# Patient Record
(demographics unavailable — no encounter records)

---

## 2024-11-18 NOTE — PHYSICAL EXAM
[[id number]] : A chaperone was present during the pelvic exam. [Appropriately responsive] : appropriately responsive [Alert] : alert [No Acute Distress] : no acute distress [No Lymphadenopathy] : no lymphadenopathy [Regular Rate Rhythm] : regular rate rhythm [No Murmurs] : no murmurs [Clear to Auscultation B/L] : clear to auscultation bilaterally [Soft] : soft [Non-tender] : non-tender [Non-distended] : non-distended [No HSM] : No HSM [No Lesions] : no lesions [No Mass] : no mass [Oriented x3] : oriented x3 [Examination Of The Breasts] : a normal appearance [No Masses] : no breast masses were palpable [Vulvar Atrophy] : vulvar atrophy [Labia Majora] : normal [Labia Minora] : normal [Atrophy] : atrophy [Normal] : normal [Uterine Adnexae] : normal [Declined] : Patient declined rectal exam [FreeTextEntry2] :  Elva tapia)

## 2024-11-18 NOTE — PLAN
[FreeTextEntry1] :  #HCM  -Breast self exam reviewed and taught  -STI Screening declined  -Pap/HPV today  -Rx for mammogram and breast sonogram given  -DXA to be Rxed  -Advised pt to schedule colonoscopy  -Immunizations: UTD    RTO in 1 year for annual or PRN for any GYN complaints   LORY Dumas MD

## 2024-11-18 NOTE — HISTORY OF PRESENT ILLNESS
[Patient reported colonoscopy was normal] : Patient reported colonoscopy was normal [FreeTextEntry1] :  2024. MOISES BAUTISTA 60 year old female  LMP PM  presents for an annual gyn exam and to establish care. PMH of HLD.   No vaginal discharge or vaginitis symptoms. She denies abdominal or pelvic pain. No urinary complaints. BM is normal per patient. Currently sexually active, uses condoms.   Denies changes in medical status, medications, serious illness, hospitalizations, and surgeries.   OBHx: ; b/l oophorectomy 2/2 ovarian cysts-benign G1-   6lb 2oz G2   6lb 13oz  GynHx: denies  PMH: HLD  SHx: ex-lap bilateral oophorectomy ()  Meds: rosuvastatin, methocarbamol   All: NKDA; IV contrast  Soc: No alcohol, drug, or tobacco use, non-smoker.  Psych: negative  FHx: Denies FHx of breast, ovarian, uterine or colon cancer. [Mammogramdate] : 04/2024 [TextBox_19] : BIRADS 1 [BreastSonogramDate] : 04/2024 [TextBox_25] : BIRADS 1 [TextBox_31] : unknown [BoneDensityDate] : 06/2022 [TextBox_37] : normal [ColonoscopyDate] : 2018 [TextBox_43] : due in 5 years

## 2024-12-10 NOTE — PHYSICAL EXAM
[No Acute Distress] : no acute distress [Well Nourished] : well nourished [Well Developed] : well developed [Supple] : supple [No Respiratory Distress] : no respiratory distress  [Clear to Auscultation] : lungs were clear to auscultation bilaterally [Normal Rate] : normal rate  [Regular Rhythm] : with a regular rhythm [Normal S1, S2] : normal S1 and S2 [No Edema] : there was no peripheral edema [Soft] : abdomen soft [Non Tender] : non-tender [Normal Bowel Sounds] : normal bowel sounds [No CVA Tenderness] : no CVA  tenderness [No Spinal Tenderness] : no spinal tenderness [No Joint Swelling] : no joint swelling [Grossly Normal Strength/Tone] : grossly normal strength/tone [Normal Gait] : normal gait [Speech Grossly Normal] : speech grossly normal [Normal Affect] : the affect was normal [Alert and Oriented x3] : oriented to person, place, and time [de-identified] : Overweight female in stated age,

## 2024-12-10 NOTE — PHYSICAL EXAM
[No Acute Distress] : no acute distress [Well Nourished] : well nourished [Well Developed] : well developed [Supple] : supple [No Respiratory Distress] : no respiratory distress  [Clear to Auscultation] : lungs were clear to auscultation bilaterally [Normal Rate] : normal rate  [Regular Rhythm] : with a regular rhythm [Normal S1, S2] : normal S1 and S2 [No Edema] : there was no peripheral edema [Soft] : abdomen soft [Non Tender] : non-tender [Normal Bowel Sounds] : normal bowel sounds [No CVA Tenderness] : no CVA  tenderness [No Spinal Tenderness] : no spinal tenderness [No Joint Swelling] : no joint swelling [Grossly Normal Strength/Tone] : grossly normal strength/tone [Normal Gait] : normal gait [Speech Grossly Normal] : speech grossly normal [Normal Affect] : the affect was normal [Alert and Oriented x3] : oriented to person, place, and time [de-identified] : Overweight female in stated age,

## 2024-12-10 NOTE — HISTORY OF PRESENT ILLNESS
[FreeTextEntry1] : Pt presented follow up. [de-identified] : Pt continues to have palpitation, she has it daily, it can be 1-2 x per day, it lasted a few minutes.  She feels that it can occur randomly.  She denied associated CP, SOB or dizziness.  She thinks she may have some chest discomfort in the chest.  She feels that symptoms resolve completely.  She feels that her stress level did not change.  She does a lot of walking at lunch time.  She saw GYn and

## 2024-12-10 NOTE — HISTORY OF PRESENT ILLNESS
[FreeTextEntry1] : Pt presented follow up. [de-identified] : Pt continues to have palpitation, she has it daily, it can be 1-2 x per day, it lasted a few minutes.  She feels that it can occur randomly.  She denied associated CP, SOB or dizziness.  She thinks she may have some chest discomfort in the chest.  She feels that symptoms resolve completely.  She feels that her stress level did not change.  She does a lot of walking at lunch time.  She saw GYn and

## 2025-01-23 NOTE — PLAN
[TextEntry] : - Symptoms and signs consistent with LPR. Discussed diet and lifestyle changes. Written instructions given to the patient today. - Thyroid US showing sub centimeter cysts. No need to follow up for that. - Feeling some pressure in the mid neck bilaterally. No masses on palpation or US in the  office today. - Discussed findings with the patient and her  today. - Return as needed.

## 2025-01-23 NOTE — HISTORY OF PRESENT ILLNESS
[de-identified] : 60 yro pt here for eval of thyroid.  Patient reports that she feels like her neck feels "off", feels like there is something there, but does not feel any lumps. Able to move neck around. No pain, but feels some discomfort. Reports that her PCP said everything looked normal, but patient would like to make sure nothing else is there.  Today presents for follow up.  Reports she feels something more on the sides of her throat now.  No dysphagia, dysphonia or dyspnea Patient denies h/o radiation and has no family h/o thyroid cancer. No biopsies US thyroid 9/7/2024: IMPRESSION: Scattered subcentimeter benign-appearing colloid cysts. There are no suspicious lesions TI-RAD 1: Benign (No FNA)  General

## 2025-01-23 NOTE — HISTORY OF PRESENT ILLNESS
[de-identified] : 60 yro pt here for eval of thyroid.  Patient reports that she feels like her neck feels "off", feels like there is something there, but does not feel any lumps. Able to move neck around. No pain, but feels some discomfort. Reports that her PCP said everything looked normal, but patient would like to make sure nothing else is there.  Today presents for follow up.  Reports she feels something more on the sides of her throat now.  No dysphagia, dysphonia or dyspnea Patient denies h/o radiation and has no family h/o thyroid cancer. No biopsies US thyroid 9/7/2024: IMPRESSION: Scattered subcentimeter benign-appearing colloid cysts. There are no suspicious lesions TI-RAD 1: Benign (No FNA)

## 2025-07-01 NOTE — PHYSICAL EXAM
[No Acute Distress] : no acute distress [Well Nourished] : well nourished [Well Developed] : well developed [Normal Sclera/Conjunctiva] : normal sclera/conjunctiva [PERRL] : pupils equal round and reactive to light [EOMI] : extraocular movements intact [Normal Outer Ear/Nose] : the outer ears and nose were normal in appearance [Normal Oropharynx] : the oropharynx was normal [Normal TMs] : both tympanic membranes were normal [No JVD] : no jugular venous distention [No Lymphadenopathy] : no lymphadenopathy [Supple] : supple [No Respiratory Distress] : no respiratory distress  [Clear to Auscultation] : lungs were clear to auscultation bilaterally [Normal Rate] : normal rate  [Regular Rhythm] : with a regular rhythm [Normal S1, S2] : normal S1 and S2 [No Carotid Bruits] : no carotid bruits [Pedal Pulses Present] : the pedal pulses are present [No Edema] : there was no peripheral edema [No Extremity Clubbing/Cyanosis] : no extremity clubbing/cyanosis [Normal Appearance] : normal in appearance [No Masses] : no palpable masses [No Nipple Discharge] : no nipple discharge [No Axillary Lymphadenopathy] : no axillary lymphadenopathy [Soft] : abdomen soft [Non Tender] : non-tender [Non-distended] : non-distended [Normal Bowel Sounds] : normal bowel sounds [Normal Sphincter Tone] : normal sphincter tone [No Mass] : no mass [Normal Supraclavicular Nodes] : no supraclavicular lymphadenopathy [Normal Axillary Nodes] : no axillary lymphadenopathy [Normal Posterior Cervical Nodes] : no posterior cervical lymphadenopathy [Normal Anterior Cervical Nodes] : no anterior cervical lymphadenopathy [No CVA Tenderness] : no CVA  tenderness [No Spinal Tenderness] : no spinal tenderness [No Joint Swelling] : no joint swelling [Grossly Normal Strength/Tone] : grossly normal strength/tone [No Rash] : no rash [Coordination Grossly Intact] : coordination grossly intact [No Focal Deficits] : no focal deficits [Normal Gait] : normal gait [Speech Grossly Normal] : speech grossly normal [Normal Affect] : the affect was normal [Alert and Oriented x3] : oriented to person, place, and time [Normal Mood] : the mood was normal [Stool Occult Blood] : stool negative for occult blood [de-identified] : Overweight female in stated age,  [de-identified] : S

## 2025-07-01 NOTE — HISTORY OF PRESENT ILLNESS
[Spouse] : spouse [FreeTextEntry1] : 61 y/o woman presented for PE.  Last PE was 1 year ago. [de-identified] : Her health was uneventful since last visit, she has no new complaint.  Pt saw cardiologist in 12/2025 for palpitations.  Echo and Holter were unremarkable. She still has occ fast heart beat.  Pt had COVID infection in 9/2022 and 8/2023, they were mild.  She had 4 doses of COVID vaccine and is UTD with flu vaccine.

## 2025-07-01 NOTE — REVIEW OF SYSTEMS
[Negative] : Heme/Lymph [Palpitations] : palpitations [Skin Rash] : skin rash [Fever] : no fever [Chills] : no chills [Fatigue] : no fatigue [Recent Change In Weight] : ~T no recent weight change [Chest Pain] : no chest pain [Lower Ext Edema] : no lower extremity edema [Shortness Of Breath] : no shortness of breath [Wheezing] : no wheezing [Cough] : no cough [Dyspnea on Exertion] : no dyspnea on exertion [Abdominal Pain] : no abdominal pain [Nausea] : no nausea [Constipation] : no constipation [Diarrhea] : diarrhea [Vomiting] : no vomiting [Heartburn] : no heartburn [Melena] : no melena [Dysuria] : no dysuria [Incontinence] : no incontinence [Nocturia] : no nocturia [Hematuria] : no hematuria [Joint Pain] : no joint pain [Joint Stiffness] : no joint stiffness [Joint Swelling] : no joint swelling [Muscle Pain] : no muscle pain [Back Pain] : no back pain [Itching] : no itching [Mole Changes] : no mole changes [Headache] : no headache [Dizziness] : no dizziness [Fainting] : no fainting [Unsteady Walking] : no ataxia [Insomnia] : no insomnia [Anxiety] : no anxiety [Depression] : no depression [Easy Bleeding] : no easy bleeding [Easy Bruising] : no easy bruising [Swollen Glands] : no swollen glands [FreeTextEntry3] : Wears corrective lens,  [FreeTextEntry5] : As in HPI,  [FreeTextEntry9] : Neck pain as in HPI, [de-identified] : Occ  [de-identified] : Has chronic HA with neck discomfort,

## 2025-07-01 NOTE — ASSESSMENT
[Vaccines Reviewed] : Immunizations reviewed today. Please see immunization details in the vaccine log within the immunization flowsheet.  [FreeTextEntry1] : Patient was up-to-date with all HCM tests.  She was given Rx to repeat mammogram.  She will f/u with GI to determine when she needs to repeat colonoscopy.  She was reminded to have routine eye exam and dental care.

## 2025-07-01 NOTE — HEALTH RISK ASSESSMENT
[Very Good] : ~his/her~  mood as very good [Yes] : Yes [Monthly or less (1 pt)] : Monthly or less (1 point) [1 or 2 (0 pts)] : 1 or 2 (0 points) [Never (0 pts)] : Never (0 points) [No] : In the past 12 months have you used drugs other than those required for medical reasons? No [No falls in past year] : Patient reported no falls in the past year [Little interest or pleasure doing things] : 1) Little interest or pleasure doing things [Feeling down, depressed, or hopeless] : 2) Feeling down, depressed, or hopeless [0] : 2) Feeling down, depressed, or hopeless: Not at all (0) [PHQ-2 Negative - No further assessment needed] : PHQ-2 Negative - No further assessment needed [Never] : Never [None] : None [With Family] : lives with family [# of Members in Household ___] :  household currently consist of [unfilled] member(s) [Employed] : employed [College] : College [] :  [# Of Children ___] : has [unfilled] children [Feels Safe at Home] : Feels safe at home [Fully functional (bathing, dressing, toileting, transferring, walking, feeding)] : Fully functional (bathing, dressing, toileting, transferring, walking, feeding) [Fully functional (using the telephone, shopping, preparing meals, housekeeping, doing laundry, using] : Fully functional and needs no help or supervision to perform IADLs (using the telephone, shopping, preparing meals, housekeeping, doing laundry, using transportation, managing medications and managing finances) [Smoke Detector] : smoke detector [Carbon Monoxide Detector] : carbon monoxide detector [Seat Belt] :  uses seat belt [With Patient/Caregiver] : , with patient/caregiver [YES] : Yes [Have you attended a firearm safety workshop or class?] : A firearm safety workshop or class has been attended. [Audit-CScore] : 1 [de-identified] :  walk for an hour at lunch break for 5 days per week, [[id number]] : 0 [Are there any unlocked firearms stored in your household?] : No unlocked firearms in the household. [Are there any firearms stored in your household that are loaded?] : No firearms are stored in the household loaded. [Are there any children in your household?] : No children are in the household. [Has anyone in the household been feeling low/depressed/been struggling?] : No one in the household has been feeling low/depressed/been struggling. [EyeExamDate] : 06/25 [Change in mental status noted] : No change in mental status noted [Language] : denies difficulty with language [Behavior] : denies difficulty with behavior [Learning/Retaining New Information] : denies difficulty learning/retaining new information [Handling Complex Tasks] : denies difficulty handling complex tasks [Reasoning] : denies difficulty with reasoning [Spatial Ability and Orientation] : denies difficulty with spatial ability and orientation [Reports changes in hearing] : Reports no changes in hearing [Reports changes in vision] : Reports no changes in vision [Reports changes in dental health] : Reports no changes in dental health [MammogramDate] : 04/24 [MammogramComments] : US breast - 7/2022 [PapSmearDate] : 11/24 [BoneDensityDate] : 06/22 [ColonoscopyDate] : 08/18 [de-identified] : dentist - 04/2025, 1-2x per year [AdvancecareDate] : 07/25